# Patient Record
Sex: FEMALE | URBAN - METROPOLITAN AREA
[De-identification: names, ages, dates, MRNs, and addresses within clinical notes are randomized per-mention and may not be internally consistent; named-entity substitution may affect disease eponyms.]

---

## 2018-12-28 ENCOUNTER — OUTPATIENT (OUTPATIENT)
Dept: OUTPATIENT SERVICES | Facility: HOSPITAL | Age: 58
LOS: 1 days | Discharge: HOME | End: 2018-12-28

## 2018-12-29 DIAGNOSIS — R05 COUGH: ICD-10-CM

## 2019-01-02 ENCOUNTER — OUTPATIENT (OUTPATIENT)
Dept: OUTPATIENT SERVICES | Facility: HOSPITAL | Age: 59
LOS: 1 days | Discharge: HOME | End: 2019-01-02

## 2019-01-02 DIAGNOSIS — R53.81 OTHER MALAISE: ICD-10-CM

## 2019-01-02 DIAGNOSIS — R53.83 OTHER FATIGUE: ICD-10-CM

## 2019-01-17 ENCOUNTER — OUTPATIENT (OUTPATIENT)
Dept: OUTPATIENT SERVICES | Facility: HOSPITAL | Age: 59
LOS: 1 days | Discharge: HOME | End: 2019-01-17

## 2019-01-17 DIAGNOSIS — E21.3 HYPERPARATHYROIDISM, UNSPECIFIED: ICD-10-CM

## 2019-01-17 DIAGNOSIS — I48.91 UNSPECIFIED ATRIAL FIBRILLATION: ICD-10-CM

## 2019-01-17 DIAGNOSIS — R79.9 ABNORMAL FINDING OF BLOOD CHEMISTRY, UNSPECIFIED: ICD-10-CM

## 2019-01-17 DIAGNOSIS — E78.5 HYPERLIPIDEMIA, UNSPECIFIED: ICD-10-CM

## 2019-01-17 DIAGNOSIS — D64.9 ANEMIA, UNSPECIFIED: ICD-10-CM

## 2022-07-12 ENCOUNTER — APPOINTMENT (OUTPATIENT)
Dept: NEUROLOGY | Facility: CLINIC | Age: 62
End: 2022-07-12

## 2022-07-12 VITALS
WEIGHT: 115 LBS | SYSTOLIC BLOOD PRESSURE: 152 MMHG | HEART RATE: 90 BPM | DIASTOLIC BLOOD PRESSURE: 83 MMHG | BODY MASS INDEX: 22.58 KG/M2 | HEIGHT: 60 IN

## 2022-07-12 PROBLEM — Z00.00 ENCOUNTER FOR PREVENTIVE HEALTH EXAMINATION: Status: ACTIVE | Noted: 2022-07-12

## 2022-07-12 PROCEDURE — 99214 OFFICE O/P EST MOD 30 MIN: CPT

## 2022-07-12 NOTE — ASSESSMENT
[FreeTextEntry1] : Sleep apnea on CPAP- will get the most recent CPAP data to look for efficacy and compliance.   patient still has not found CPAP therapy comfortable which is probably due to uncomfortable mask, therefore I recommend that she  calls her home care company to see if she can get a different types of masks\par \par  chronic insomnia- continue with Ambien\par \par  history of CVA- on proper secondary stroke prevention\par \par Total clinician time spent  is  35 minutes including preparing to see the patient, obtaining and/or reviewing and confirming history, performing a medically necessary and appropriate examination, counseling and educating the patient and/or family, documenting clinical information in the HER and communicating and/or referring to other healthcare professionals.\par \par

## 2022-07-12 NOTE — HISTORY OF PRESENT ILLNESS
[FreeTextEntry1] : Home Edward returns to the office for follow up and patient’s prior history and physical have been reviewed and she reports no change since last visit. She is initially being seen for symptoms of CVA and headaches. She continues to have sleeping issues. Patient complains of trouble staying asleep and needing medications to fall asleep. She takes zolpidem 10mg and gabapentin 300mg which she finds helpful. She complains of daytime sleepiness throughout the day and bags under her eyes. She tends to wake up once around 4am to use the bathroom but is unable to fall back asleep. Patient has not undergone sleep study as of yet, she is fearful of doing an in lab sleep study. Since last encounter, patient reports no change. She continues to be on the same medication regimen of Ambien and Gabapentin. Gabapentin has helped reduce her headaches and Ambien helps her sleep. In addition, patient completed sleep study which shows evidence of mild-moderate sleep apnea with AHI of 12 and REM AHI of 22 and lowest O2 at 87%. In addition, patient states she continues to have gait instability and right upper extremity weakness. She denies any falls.\par \par Today, patient presents for neurologic revisit encounter in regard to chronic insomnia and EDGARDO. Since last visit, patient reports receiving CPAP machine on May 3rd. Patient uses machine about 4-5 hours every night. She admits having difficulty getting used to but has noticed deeper sleep. She takes both Ambien and Gabapentin which she finds helpful.\par \par Since last visit, patient reports no significant change. She continues to use CPAP machine for approximately 4-5 hours. She still has difficulty tolerating the mask. Patient also continues to use Ambien as well which helps her sleep at night.

## 2023-01-03 ENCOUNTER — APPOINTMENT (OUTPATIENT)
Dept: NEUROLOGY | Facility: CLINIC | Age: 63
End: 2023-01-03
Payer: MEDICARE

## 2023-01-03 VITALS
SYSTOLIC BLOOD PRESSURE: 134 MMHG | HEIGHT: 60 IN | WEIGHT: 116 LBS | DIASTOLIC BLOOD PRESSURE: 79 MMHG | HEART RATE: 85 BPM | BODY MASS INDEX: 22.78 KG/M2

## 2023-01-03 DIAGNOSIS — M25.562 PAIN IN LEFT KNEE: ICD-10-CM

## 2023-01-03 DIAGNOSIS — M25.571 PAIN IN RIGHT ANKLE AND JOINTS OF RIGHT FOOT: ICD-10-CM

## 2023-01-03 DIAGNOSIS — T88.7XXA UNSPECIFIED ADVERSE EFFECT OF DRUG OR MEDICAMENT, INITIAL ENCOUNTER: ICD-10-CM

## 2023-01-03 PROCEDURE — 99214 OFFICE O/P EST MOD 30 MIN: CPT

## 2023-01-03 NOTE — PHYSICAL EXAM
[Person] : oriented to person [Place] : oriented to place [Time] : oriented to time [Concentration Intact] : normal concentrating ability [Visual Intact] : visual attention was ~T not ~L decreased [Naming Objects] : no difficulty naming common objects [Repeating Phrases] : no difficulty repeating a phrase [Writing A Sentence] : no difficulty writing a sentence [Fluency] : fluency intact [Comprehension] : comprehension intact [Reading] : reading intact [Past History] : adequate knowledge of personal past history [Cranial Nerves Optic (II)] : visual acuity intact bilaterally,  visual fields full to confrontation, pupils equal round and reactive to light [Cranial Nerves Oculomotor (III)] : extraocular motion intact [Cranial Nerves Trigeminal (V)] : facial sensation intact symmetrically [Cranial Nerves Facial (VII)] : face symmetrical [Cranial Nerves Vestibulocochlear (VIII)] : hearing was intact bilaterally [Cranial Nerves Glossopharyngeal (IX)] : tongue and palate midline [Cranial Nerves Accessory (XI - Cranial And Spinal)] : head turning and shoulder shrug symmetric [Cranial Nerves Hypoglossal (XII)] : there was no tongue deviation with protrusion [Motor Tone] : muscle tone was normal in all four extremities [No Muscle Atrophy] : normal bulk in all four extremities [Sensation Tactile Decrease] : light touch was intact [Past-pointing] : there was no past-pointing [Tremor] : no tremor present [2+] : Ankle jerk left 2+ [Plantar Reflex Right Only] : normal on the right [Plantar Reflex Left Only] : normal on the left [FreeTextEntry5] : mild motor aphasia, stuttering speech [FreeTextEntry6] : right hemiparesis 4/5 [FreeTextEntry8] : antalgic gait, walks with cane

## 2023-01-03 NOTE — HISTORY OF PRESENT ILLNESS
[FreeTextEntry1] : Ms. Erasmo Bhat returns to the office for follow-up and her prior history and physical have been reviewed and she reports no change since last visit.  she has been seeing us for Past history of CVA with residual right hemiparesis, chronic insomnia on Ambien, chronic migraine headaches using gabapentin for prophylaxis, sleep apnea on CPAP therapy.  She reports no significant change in the last 6 months regarding her conditions.  She still has trouble using CPAP therapy the whole night, but does get at least 4-5 hours in.  She does feel less tired during the day.  She is getting and replacing supplies.  In terms of her insomnia, despite using Ambien as well as gabapentin, she still wakes up at night.  She has tried 2 tablets of gabapentin, but she noticed increased nocturnal eating and weight gain therefore she had gone down to just 1 tablet a day most days.  She still gets about 3 headaches a week, sometimes upon awakening and may be associated with right trapezius muscle spasm.  She denies any significant neck pain.\par \par   Her main complaint today is more related to her right ankle pain  and left knee pain which is causing significant mobility issues.  She has seen podiatrist as well as Orthopedics in the past without improvement.\par

## 2023-01-03 NOTE — ASSESSMENT
[FreeTextEntry1] :   History of CVA-  on proper secondary stroke prevention medications including Plavix, atorvastain, metoprolol.  \par \par  chronic insomnia -continue with Ambien as well as gabapentin\par \par Chronic migraine headaches - will add on magnesium for additional prophylaxis\par \par Sleep apnea -continue with CPAP therapy\par \par  right ankle pain / left knee pain - since the doctor that she has been seen has not given her relief, I recommend getting a 2nd opinion from our orthopedics Dr.\par \par Medication side effect-  should continue to monitor for weight gain from gabapentin\par \par Total clinician time spent  is  35 minutes including preparing to see the patient, obtaining and/or reviewing and confirming history, performing a medically necessary and appropriate examination, counseling and educating the patient and/or family, documenting clinical information in the HER and communicating and/or referring to other healthcare professionals.\par \par

## 2023-06-27 ENCOUNTER — APPOINTMENT (OUTPATIENT)
Dept: NEUROLOGY | Facility: CLINIC | Age: 63
End: 2023-06-27
Payer: MEDICARE

## 2023-06-27 PROCEDURE — 99214 OFFICE O/P EST MOD 30 MIN: CPT

## 2023-06-27 NOTE — PHYSICAL EXAM
[Person] : oriented to person [Place] : oriented to place [Time] : oriented to time [Concentration Intact] : normal concentrating ability [Visual Intact] : visual attention was ~T not ~L decreased [Naming Objects] : no difficulty naming common objects [Repeating Phrases] : no difficulty repeating a phrase [Writing A Sentence] : no difficulty writing a sentence [Fluency] : fluency intact [Comprehension] : comprehension intact [Reading] : reading intact [Past History] : adequate knowledge of personal past history [Cranial Nerves Optic (II)] : visual acuity intact bilaterally,  visual fields full to confrontation, pupils equal round and reactive to light [Cranial Nerves Oculomotor (III)] : extraocular motion intact [Cranial Nerves Trigeminal (V)] : facial sensation intact symmetrically [Cranial Nerves Facial (VII)] : face symmetrical [Cranial Nerves Vestibulocochlear (VIII)] : hearing was intact bilaterally [Cranial Nerves Glossopharyngeal (IX)] : tongue and palate midline [Cranial Nerves Accessory (XI - Cranial And Spinal)] : head turning and shoulder shrug symmetric [Cranial Nerves Hypoglossal (XII)] : there was no tongue deviation with protrusion [Motor Tone] : muscle tone was normal in all four extremities [No Muscle Atrophy] : normal bulk in all four extremities [Sensation Tactile Decrease] : light touch was intact [2+] : Ankle jerk left 2+ [Past-pointing] : there was no past-pointing [Tremor] : no tremor present [Plantar Reflex Right Only] : normal on the right [Plantar Reflex Left Only] : normal on the left [FreeTextEntry5] : mild motor aphasia, stuttering speech [FreeTextEntry6] : right hemiparesis 4/5 [FreeTextEntry8] : antalgic gait, walks with cane

## 2023-06-27 NOTE — ASSESSMENT
[FreeTextEntry1] : Sleep Apnea Follow up. \par \par - Refill Gabapentin 300 mg 1 Tablet daily. \par - I Will Follow up with her in Six Months. \par \par \par \par \par \par \par \par I, Nicolle Montero, Attest that this documentation has been prepared under the direction and in the presence of Provider Gildardo Garcia DO\par \par Thank You for letting me assist in the management of this patient. \par \par Gildardo Garcia DO\[employer] Board Certified, Neurology\par

## 2023-06-27 NOTE — HISTORY OF PRESENT ILLNESS
[FreeTextEntry1] : Ms. BERE quarles is a 63 year old woman who returns to us for a follow up in regards to sleep apnea. She has been utilizing CPAP machine for one year. Recently however, patients CPAP machine was turned off due to the machine not being able to  her sleep. Patient says she has been using the machine at night getting up to 4 hours of sleep at night.  Patient will contact home care company in hopes of machine being turned on. She continues to take Gabapentin with no side effects or complaints. We will refill medication for patient today. \par \par \par \par \par \par \par \par \par \par  returns to the office for follow-up and her prior history and physical have been reviewed and she reports no change since last visit. Past history of CVA with residual right hemiparesis, chronic insomnia on Ambien, chronic migraine headaches using gabapentin for prophylaxis, sleep apnea on CPAP therapy. She has tried 2 tablets of gabapentin 1 tab daily. .Her main complaint today is more related to her right ankle pain  and left knee pain which is causing significant mobility issues.  She has seen podiatrist as well as Orthopedics in the past without improvement.\par

## 2023-12-19 ENCOUNTER — APPOINTMENT (OUTPATIENT)
Dept: NEUROLOGY | Facility: CLINIC | Age: 63
End: 2023-12-19
Payer: MEDICARE

## 2023-12-19 VITALS — BODY MASS INDEX: 23.56 KG/M2 | WEIGHT: 120 LBS | HEIGHT: 60 IN

## 2023-12-19 PROCEDURE — 99214 OFFICE O/P EST MOD 30 MIN: CPT

## 2023-12-19 NOTE — HISTORY OF PRESENT ILLNESS
[FreeTextEntry1] : Ms. Erasmo Bhat returns to the office for follow-up and her prior history and physical have been reviewed and she reports no change since last visit.  she has been seeing us for chronic insomnia, obstructive sleep apnea.  Her conditions have been stable with both CPAP therapy as well as the use of Ambien plus gabapentin.  Recently her machine was taken away because she was not meeting the compliance with usage only around 40%.  She reports that she tried to put the mask on every night however in the middle of the night automatically she took it off without any awareness.  She also reports that using CPAP is a hassle and does not wish to continue.  She noticed no difference in her sleep with that without the machine.  She had a history of CVA and is currently on Plavix and atorvastatin for secondary stroke prevention

## 2023-12-19 NOTE — ASSESSMENT
[FreeTextEntry1] : Obstructive sleep apnea-unable to accustomed to CPAP therapy -Dental referral for oral appliance - Patient advised that treating sleep apnea is necessary to decrease her future CVA risk, as well as protecting her airway since she is on hypnotics  Chronic insomnia - Continue with gabapentin, Ambien as needed - Patient warned of the long-term side effect of hypnotic use, and she reported understanding  History of CVA - On proper secondary stroke prevention medication, Plavix and atorvastatin  Total clinician time spent  is  31 minutes including preparing to see the patient, obtaining and/or reviewing and confirming history, performing a medically necessary and appropriate examination, counseling and educating the patient and/or family, documenting clinical information in the HER and communicating and/or referring to other healthcare professionals.

## 2024-06-18 ENCOUNTER — APPOINTMENT (OUTPATIENT)
Dept: NEUROLOGY | Facility: CLINIC | Age: 64
End: 2024-06-18
Payer: MEDICARE

## 2024-06-18 VITALS
WEIGHT: 124 LBS | HEART RATE: 88 BPM | HEIGHT: 60 IN | SYSTOLIC BLOOD PRESSURE: 130 MMHG | DIASTOLIC BLOOD PRESSURE: 82 MMHG | BODY MASS INDEX: 24.35 KG/M2

## 2024-06-18 DIAGNOSIS — G47.33 OBSTRUCTIVE SLEEP APNEA (ADULT) (PEDIATRIC): ICD-10-CM

## 2024-06-18 DIAGNOSIS — F51.04 PSYCHOPHYSIOLOGIC INSOMNIA: ICD-10-CM

## 2024-06-18 DIAGNOSIS — Z86.73 PERSONAL HISTORY OF TRANSIENT ISCHEMIC ATTACK (TIA), AND CEREBRAL INFARCTION W/OUT RESIDUAL DEFICITS: ICD-10-CM

## 2024-06-18 PROCEDURE — 99214 OFFICE O/P EST MOD 30 MIN: CPT

## 2024-06-18 RX ORDER — GABAPENTIN 300 MG/1
300 CAPSULE ORAL DAILY
Qty: 180 | Refills: 1 | Status: ACTIVE | COMMUNITY
Start: 2022-10-17 | End: 1900-01-01

## 2024-06-18 RX ORDER — METOPROLOL SUCCINATE 25 MG/1
25 TABLET, EXTENDED RELEASE ORAL
Refills: 0 | Status: ACTIVE | COMMUNITY

## 2024-06-18 RX ORDER — CLOPIDOGREL BISULFATE 75 MG/1
75 TABLET, FILM COATED ORAL
Refills: 0 | Status: ACTIVE | COMMUNITY

## 2024-06-18 RX ORDER — ZOLPIDEM TARTRATE 10 MG/1
10 TABLET ORAL
Qty: 30 | Refills: 5 | Status: ACTIVE | COMMUNITY
Start: 2022-07-12 | End: 1900-01-01

## 2024-06-18 RX ORDER — ATORVASTATIN CALCIUM 40 MG/1
40 TABLET, FILM COATED ORAL
Refills: 0 | Status: ACTIVE | COMMUNITY

## 2024-06-18 NOTE — HISTORY OF PRESENT ILLNESS
[FreeTextEntry1] : Ms. Erasmo Bhat returns to the office for follow-up and her prior history and physical have been reviewed and she reports no change since last visit.  she has been seeing us for chronic insomnia, stable on zolpidem, History of CVA with Plavix and atorvastatin for secondary stroke prevention, and obstructive sleep apnea.  She was previously on CPAP therapy but decided not to continue with treatment since she did not notice any difference and it was difficulty to tolerate.   Ms. Erasmo Bhat returns to the office for follow-up and her prior history and physical have been reviewed and she reports no change since last visit.  she has been seeing us for chronic insomnia, obstructive sleep apnea.  Her conditions have been stable with both CPAP therapy as well as the use of Ambien plus gabapentin.  Recently her machine was taken away because she was not meeting the compliance with usage only around 40%.  She reports that she tried to put the mask on every night however in the middle of the night automatically she took it off without any awareness.  She also reports that using CPAP is a hassle and does not wish to continue.  She noticed no difference in her sleep with that without the machine.  She had a history of CVA and is currently on Plavix and atorvastatin for secondary stroke prevention

## 2024-06-18 NOTE — ASSESSMENT
[FreeTextEntry1] : Obstructive sleep apnea-unable to tolerate CPAP therapy - Patient wished to hold off on treatment for now despite knowing the benefit of treating sleep apnea as well as the risks of not treating it.  Chronic insomnia-stable - Continue with zolpidem  History of CVA - Continue with Plavix and atorvastatin for secondary stroke prevention  Total clinician time spent  is  31 minutes including preparing to see the patient, obtaining and/or reviewing and confirming history, performing a medically necessary and appropriate examination, counseling and educating the patient and/or family, documenting clinical information in the HER and communicating and/or referring to other healthcare professionals.

## 2024-12-17 ENCOUNTER — APPOINTMENT (OUTPATIENT)
Dept: NEUROLOGY | Facility: CLINIC | Age: 64
End: 2024-12-17
Payer: MEDICARE

## 2024-12-17 VITALS — HEIGHT: 60 IN | WEIGHT: 123 LBS | BODY MASS INDEX: 24.15 KG/M2

## 2024-12-17 VITALS — HEART RATE: 69 BPM | DIASTOLIC BLOOD PRESSURE: 90 MMHG | SYSTOLIC BLOOD PRESSURE: 135 MMHG

## 2024-12-17 DIAGNOSIS — F51.04 PSYCHOPHYSIOLOGIC INSOMNIA: ICD-10-CM

## 2024-12-17 DIAGNOSIS — Z86.73 PERSONAL HISTORY OF TRANSIENT ISCHEMIC ATTACK (TIA), AND CEREBRAL INFARCTION W/OUT RESIDUAL DEFICITS: ICD-10-CM

## 2024-12-17 DIAGNOSIS — G47.33 OBSTRUCTIVE SLEEP APNEA (ADULT) (PEDIATRIC): ICD-10-CM

## 2024-12-17 PROCEDURE — 99214 OFFICE O/P EST MOD 30 MIN: CPT

## 2024-12-17 RX ORDER — GABAPENTIN 300 MG/1
300 CAPSULE ORAL 3 TIMES DAILY
Qty: 90 | Refills: 3 | Status: ACTIVE | COMMUNITY
Start: 2024-12-17 | End: 1900-01-01

## 2025-06-17 ENCOUNTER — APPOINTMENT (OUTPATIENT)
Dept: NEUROLOGY | Facility: CLINIC | Age: 65
End: 2025-06-17
Payer: MEDICARE

## 2025-06-17 VITALS
HEART RATE: 98 BPM | WEIGHT: 120 LBS | HEIGHT: 59 IN | BODY MASS INDEX: 24.19 KG/M2 | SYSTOLIC BLOOD PRESSURE: 142 MMHG | DIASTOLIC BLOOD PRESSURE: 89 MMHG

## 2025-06-17 PROCEDURE — 99214 OFFICE O/P EST MOD 30 MIN: CPT

## 2025-06-17 RX ORDER — CYCLOSPORINE 0.5 MG/ML
0.05 EMULSION OPHTHALMIC
Refills: 0 | Status: ACTIVE | COMMUNITY

## 2025-06-17 RX ORDER — CHROMIUM 200 MCG
TABLET ORAL
Refills: 0 | Status: ACTIVE | COMMUNITY

## 2025-06-17 RX ORDER — AZELASTINE HYDROCHLORIDE 0.5 MG/ML
0.05 SOLUTION/ DROPS OPHTHALMIC
Refills: 0 | Status: ACTIVE | COMMUNITY

## 2025-06-17 RX ORDER — LIDOCAINE 4% 40 MG/G
4 PATCH TOPICAL
Refills: 0 | Status: ACTIVE | COMMUNITY

## 2025-06-17 RX ORDER — ATORVASTATIN CALCIUM 20 MG/1
20 TABLET, FILM COATED ORAL
Refills: 0 | Status: ACTIVE | COMMUNITY